# Patient Record
Sex: FEMALE | Race: OTHER | NOT HISPANIC OR LATINO
[De-identification: names, ages, dates, MRNs, and addresses within clinical notes are randomized per-mention and may not be internally consistent; named-entity substitution may affect disease eponyms.]

---

## 2023-12-12 PROBLEM — Z00.00 ENCOUNTER FOR PREVENTIVE HEALTH EXAMINATION: Status: ACTIVE | Noted: 2023-12-12

## 2023-12-15 ENCOUNTER — APPOINTMENT (OUTPATIENT)
Dept: PLASTIC SURGERY | Facility: CLINIC | Age: 38
End: 2023-12-15
Payer: COMMERCIAL

## 2023-12-15 VITALS — HEIGHT: 72 IN | BODY MASS INDEX: 28.44 KG/M2 | WEIGHT: 210 LBS

## 2023-12-15 PROCEDURE — 99204 OFFICE O/P NEW MOD 45 MIN: CPT

## 2023-12-15 RX ORDER — ESTRADIOL 10 UG/1
TABLET, FILM COATED VAGINAL
Refills: 0 | Status: ACTIVE | COMMUNITY

## 2023-12-15 NOTE — HISTORY OF PRESENT ILLNESS
[FreeTextEntry1] : JENNIFER LIN is a 37 year old male to female transgender patient with a history of gender dysphoria. She seeks consultation for facial feminization surgery. She reports that she has been socially transitioning since 2019. She has been medically transitioning since 2017. She denies significant mental health history. Her past surgical history includes laser eye surgery in 2015. She is currently in transgender care at [facility]. She denies cigarette use, drinks some alcohol, and occasionally smokes marijuana. She expresses understanding that she will need to abstain from smoking for 6 weeks before and 6 weeks after surgery. Patient denies history of previous gender affirming surgeries and gender affirming procedures such as Botox, silicone, fillers, liposuction and fat grafting. Patient denies personal and family medical history of clots, strokes, bleeding disorders and anesthesia problems. She reports that the male appearance of her face exacerbate her gender dysphoria and cause misgendering.

## 2023-12-15 NOTE — ASSESSMENT
[FreeTextEntry1] : Pt was seen and examined together by LAURA Encarnacion and Dr. Quique Villalta. Assessment and plan formulated and discussed at time of visit.

## 2023-12-20 ENCOUNTER — APPOINTMENT (OUTPATIENT)
Dept: CT IMAGING | Facility: CLINIC | Age: 38
End: 2023-12-20
Payer: COMMERCIAL

## 2023-12-20 PROCEDURE — 70486 CT MAXILLOFACIAL W/O DYE: CPT | Mod: 26

## 2024-01-04 ENCOUNTER — NON-APPOINTMENT (OUTPATIENT)
Age: 39
End: 2024-01-04

## 2024-01-19 ENCOUNTER — APPOINTMENT (OUTPATIENT)
Dept: PLASTIC SURGERY | Facility: CLINIC | Age: 39
End: 2024-01-19
Payer: COMMERCIAL

## 2024-01-19 PROCEDURE — 99213 OFFICE O/P EST LOW 20 MIN: CPT

## 2024-01-19 NOTE — HISTORY OF PRESENT ILLNESS
[FreeTextEntry1] : JENNIFER LIN is a 37 year old male to female transgender patient with a history of gender dysphoria. She seeks consultation for facial feminization surgery. She reports that she has been socially transitioning since 2019. She has been medically transitioning since 2017. She denies significant mental health history. Her past surgical history includes laser eye surgery in 2015. She is currently in transgender care at [facility]. She denies cigarette use, drinks some alcohol, and occasionally smokes marijuana. She expresses understanding that she will need to abstain from smoking for 6 weeks before and 6 weeks after surgery. Patient denies history of previous gender affirming surgeries and gender affirming procedures such as Botox, silicone, fillers, liposuction and fat grafting. Patient denies personal and family medical history of clots, strokes, bleeding disorders and anesthesia problems. She reports that the male appearance of her face exacerbate her gender dysphoria and cause misgendering.

## 2024-01-26 VITALS
HEIGHT: 72 IN | DIASTOLIC BLOOD PRESSURE: 73 MMHG | SYSTOLIC BLOOD PRESSURE: 109 MMHG | WEIGHT: 227.96 LBS | RESPIRATION RATE: 16 BRPM | TEMPERATURE: 97 F | HEART RATE: 66 BPM | OXYGEN SATURATION: 98 %

## 2024-01-28 ENCOUNTER — TRANSCRIPTION ENCOUNTER (OUTPATIENT)
Age: 39
End: 2024-01-28

## 2024-01-29 ENCOUNTER — APPOINTMENT (OUTPATIENT)
Dept: PLASTIC SURGERY | Facility: HOSPITAL | Age: 39
End: 2024-01-29
Payer: COMMERCIAL

## 2024-01-29 ENCOUNTER — TRANSCRIPTION ENCOUNTER (OUTPATIENT)
Age: 39
End: 2024-01-29

## 2024-01-29 ENCOUNTER — INPATIENT (INPATIENT)
Facility: HOSPITAL | Age: 39
LOS: 0 days | Discharge: HOME | DRG: 876 | End: 2024-01-30
Attending: SURGERY | Admitting: SURGERY
Payer: MEDICAID

## 2024-01-29 DIAGNOSIS — Z98.890 OTHER SPECIFIED POSTPROCEDURAL STATES: Chronic | ICD-10-CM

## 2024-01-29 PROCEDURE — 31899 UNLISTED PX TRACHEA BRONCHI: CPT

## 2024-01-29 PROCEDURE — 15769 GRFG AUTOL SOFT TISS DIR EXC: CPT | Mod: 59

## 2024-01-29 PROCEDURE — 21139 RDCTJ FOREHEAD CNTRG&SETBACK: CPT

## 2024-01-29 PROCEDURE — 21172 RCNST SUPR-LAT ORB RM&LW FHD: CPT

## 2024-01-29 PROCEDURE — 21256 RECONSTRUCTION OF ORBIT: CPT

## 2024-01-29 PROCEDURE — 21122 GENIOP SLDG OSTEOT 2/>: CPT

## 2024-01-29 PROCEDURE — 15824 RHYTIDECTOMY FOREHEAD: CPT

## 2024-01-29 PROCEDURE — 30420 RECONSTRUCTION OF NOSE: CPT

## 2024-01-29 DEVICE — IMPLANTABLE DEVICE: Type: IMPLANTABLE DEVICE | Status: FUNCTIONAL

## 2024-01-29 DEVICE — GUIDE CUTTING TRANSFORM XS: Type: IMPLANTABLE DEVICE | Status: FUNCTIONAL

## 2024-01-29 DEVICE — SCREW BONE RESORB 2.1X4MM SONIC PIN: Type: IMPLANTABLE DEVICE | Status: FUNCTIONAL

## 2024-01-29 DEVICE — GUIDE MARKING CRANIAL: Type: IMPLANTABLE DEVICE | Status: FUNCTIONAL

## 2024-01-29 DEVICE — SCREW MAXDRIVE 1 LVL 2X7MM: Type: IMPLANTABLE DEVICE | Status: FUNCTIONAL

## 2024-01-29 DEVICE — GUIDE CUTTING W/PLAN TRANSFORM: Type: IMPLANTABLE DEVICE | Status: FUNCTIONAL

## 2024-01-29 RX ORDER — DIPHENHYDRAMINE HCL 50 MG
25 CAPSULE ORAL EVERY 4 HOURS
Refills: 0 | Status: DISCONTINUED | OUTPATIENT
Start: 2024-01-29 | End: 2024-01-30

## 2024-01-29 RX ORDER — CHLORHEXIDINE GLUCONATE 213 G/1000ML
15 SOLUTION TOPICAL
Refills: 0 | Status: DISCONTINUED | OUTPATIENT
Start: 2024-01-29 | End: 2024-01-30

## 2024-01-29 RX ORDER — CALCIUM CARBONATE 500(1250)
1 TABLET ORAL EVERY 4 HOURS
Refills: 0 | Status: DISCONTINUED | OUTPATIENT
Start: 2024-01-29 | End: 2024-01-30

## 2024-01-29 RX ORDER — ACETAMINOPHEN 500 MG
975 TABLET ORAL EVERY 8 HOURS
Refills: 0 | Status: DISCONTINUED | OUTPATIENT
Start: 2024-01-29 | End: 2024-01-30

## 2024-01-29 RX ORDER — METOCLOPRAMIDE HCL 10 MG
10 TABLET ORAL EVERY 8 HOURS
Refills: 0 | Status: DISCONTINUED | OUTPATIENT
Start: 2024-01-29 | End: 2024-01-30

## 2024-01-29 RX ORDER — DEXAMETHASONE 0.5 MG/5ML
10 ELIXIR ORAL EVERY 12 HOURS
Refills: 0 | Status: DISCONTINUED | OUTPATIENT
Start: 2024-01-29 | End: 2024-01-30

## 2024-01-29 RX ORDER — LANOLIN ALCOHOL/MO/W.PET/CERES
3 CREAM (GRAM) TOPICAL AT BEDTIME
Refills: 0 | Status: DISCONTINUED | OUTPATIENT
Start: 2024-01-29 | End: 2024-01-30

## 2024-01-29 RX ORDER — HYDROMORPHONE HYDROCHLORIDE 2 MG/ML
0.5 INJECTION INTRAMUSCULAR; INTRAVENOUS; SUBCUTANEOUS
Refills: 0 | Status: DISCONTINUED | OUTPATIENT
Start: 2024-01-29 | End: 2024-01-30

## 2024-01-29 RX ORDER — DIAZEPAM 5 MG
5 TABLET ORAL EVERY 8 HOURS
Refills: 0 | Status: DISCONTINUED | OUTPATIENT
Start: 2024-01-29 | End: 2024-01-30

## 2024-01-29 RX ORDER — CEFAZOLIN SODIUM 1 G
2000 VIAL (EA) INJECTION EVERY 8 HOURS
Refills: 0 | Status: COMPLETED | OUTPATIENT
Start: 2024-01-29 | End: 2024-01-30

## 2024-01-29 RX ORDER — OXYCODONE HYDROCHLORIDE 5 MG/1
5 TABLET ORAL EVERY 4 HOURS
Refills: 0 | Status: DISCONTINUED | OUTPATIENT
Start: 2024-01-29 | End: 2024-01-30

## 2024-01-29 RX ORDER — GABAPENTIN 400 MG/1
300 CAPSULE ORAL EVERY 12 HOURS
Refills: 0 | Status: DISCONTINUED | OUTPATIENT
Start: 2024-01-29 | End: 2024-01-30

## 2024-01-29 RX ORDER — ACETAMINOPHEN 500 MG
1000 TABLET ORAL EVERY 6 HOURS
Refills: 0 | Status: DISCONTINUED | OUTPATIENT
Start: 2024-01-29 | End: 2024-01-30

## 2024-01-29 RX ORDER — SODIUM CHLORIDE 9 MG/ML
1000 INJECTION, SOLUTION INTRAVENOUS
Refills: 0 | Status: DISCONTINUED | OUTPATIENT
Start: 2024-01-29 | End: 2024-01-30

## 2024-01-29 RX ORDER — ONDANSETRON 8 MG/1
4 TABLET, FILM COATED ORAL EVERY 6 HOURS
Refills: 0 | Status: DISCONTINUED | OUTPATIENT
Start: 2024-01-29 | End: 2024-01-30

## 2024-01-29 RX ORDER — BENZOCAINE AND MENTHOL 5; 1 G/100ML; G/100ML
1 LIQUID ORAL EVERY 4 HOURS
Refills: 0 | Status: DISCONTINUED | OUTPATIENT
Start: 2024-01-29 | End: 2024-01-30

## 2024-01-29 RX ORDER — OXYCODONE HYDROCHLORIDE 5 MG/1
10 TABLET ORAL EVERY 4 HOURS
Refills: 0 | Status: DISCONTINUED | OUTPATIENT
Start: 2024-01-29 | End: 2024-01-30

## 2024-01-29 RX ORDER — SENNA PLUS 8.6 MG/1
2 TABLET ORAL AT BEDTIME
Refills: 0 | Status: DISCONTINUED | OUTPATIENT
Start: 2024-01-29 | End: 2024-01-30

## 2024-01-29 RX ADMIN — OXYCODONE HYDROCHLORIDE 5 MILLIGRAM(S): 5 TABLET ORAL at 23:02

## 2024-01-29 RX ADMIN — Medication 400 MILLIGRAM(S): at 23:52

## 2024-01-29 RX ADMIN — SENNA PLUS 2 TABLET(S): 8.6 TABLET ORAL at 22:02

## 2024-01-29 RX ADMIN — CHLORHEXIDINE GLUCONATE 15 MILLILITER(S): 213 SOLUTION TOPICAL at 18:00

## 2024-01-29 RX ADMIN — Medication 102 MILLIGRAM(S): at 18:28

## 2024-01-29 RX ADMIN — Medication 400 MILLIGRAM(S): at 17:59

## 2024-01-29 RX ADMIN — OXYCODONE HYDROCHLORIDE 5 MILLIGRAM(S): 5 TABLET ORAL at 22:02

## 2024-01-29 RX ADMIN — HYDROMORPHONE HYDROCHLORIDE 0.5 MILLIGRAM(S): 2 INJECTION INTRAMUSCULAR; INTRAVENOUS; SUBCUTANEOUS at 17:41

## 2024-01-29 RX ADMIN — Medication 100 MILLIGRAM(S): at 19:18

## 2024-01-29 RX ADMIN — Medication 1000 MILLIGRAM(S): at 18:48

## 2024-01-29 RX ADMIN — GABAPENTIN 300 MILLIGRAM(S): 400 CAPSULE ORAL at 18:00

## 2024-01-29 NOTE — DISCHARGE NOTE PROVIDER - NSDCFUSCHEDAPPT_GEN_ALL_CORE_FT
Clarissa Lovelace  [facility] Physician [facility]  [facility] [address]  Scheduled Appointment: 02/07/2024

## 2024-01-29 NOTE — DISCHARGE NOTE PROVIDER - NSDCFUADDINST_GEN_ALL_CORE_FT
Rx sent   MEDICATIONS:  >> A prescription for pain medication was e-prescribed to your pharmacy. Please take this as needed for severe pain.   >> Do not drive while taking the prescribed pain medication  >> Do not take the pain medication on an empty stomach, this may make you nauseous  >> Constipation is not uncommon when taking prescription pain medication. You may take an over the counter stool softener like Dulcolax or Sennakot to help with this.   >> You may take over the counter pain medication such as tylenol (acetaminophen) or Advil (ibuprofen) for pain.   >> Please use peridex rinse - swish and spit twice daily for 7 days.     ACTIVITY:  >> No bending or heavy lifting. Keep your head above the level of your heart. At your first post-op visit we will assess how you are doing and will adjust the restrictions as needed.   >> Sleep with head elevated on at least 2 pillows.     DIET:  >> Intraoral incisions: Clear liquid diet for first 48 hours then may advance to full liquid diet for 7 days. Then advance to soft diet. Avoid overly hot, cold, spicy, or acidic foods.   >> Do not drink alcohol in the immediate postoperative period and while taking prescription pain medication.     WOUND CARE:  >> Gently brush teeth and keep mouth clean after eating. At your first postop visit we will assess the wound and instruct you of any care needed.   >> Do not get your face wet, but you can wash your hair and the rest of your body. If you have a nasal splint - DO NOT GET THIS WET.   >> You may gently apply ice packs, or frozen peas to the nose and eye regions. Apply this for the first 48-72 hours. 20 minutes on and 20 minutes off. If you have a nasal splint, DO NOT apply ice to your nose.   >> Keep nasal splint on until your next postop visit, do not get this wet. Change gauze under nose as needed.  Nose will bleed over the next day or two.  This is normal.  It will be a mix of blood and mucus.  If you are noticing bleeding that does not stop or is saturating more than 2-3 gauzes per hour please call our office.   >> If you were given a facial/chin garment (Jaw Bra), please wear this 24/7 until your first postop visit.      Contact us with any questions or concerns. Please follow up with Dr. Villalta within x1 week after discharge from the hospital. You may call [phone] to schedule an appointment.

## 2024-01-29 NOTE — DISCHARGE NOTE PROVIDER - NSDCCPCAREPLAN_GEN_ALL_CORE_FT
PRINCIPAL DISCHARGE DIAGNOSIS  Diagnosis: Gender dysphoria in adult  Assessment and Plan of Treatment:

## 2024-01-29 NOTE — BRIEF OPERATIVE NOTE - NSICDXBRIEFPROCEDURE_GEN_ALL_CORE_FT
PROCEDURES:  Contouring, forehead, anterior frontal sinus wall, for reduction and setback 29-Jan-2024 13:08:18  Atamian, Stefanie  Reconstruction of both supraorbital rims 29-Jan-2024 13:08:30  Atamian, Stefanie  Liposuction, with autologous fat graft transfer to face 29-Jan-2024 13:08:45  Atamian, Stefanie  Augmentation, lip 29-Jan-2024 13:08:56  Atamian, Stefanie  Genioplasty 29-Jan-2024 13:09:05  Atamian, Stefanie  Complete rhinoplasty 29-Jan-2024 13:09:14  Atamian, Stefanie  Cervical tracheoplasty 29-Jan-2024 13:09:22  Atamian, Stefanie

## 2024-01-29 NOTE — DISCHARGE NOTE PROVIDER - CARE PROVIDER_API CALL
Quique Villalta  Plastic Surgery  [address], [address]  [place] [address]  Phone: [phone]  Fax: [phone]  Follow Up Time: 1 week

## 2024-01-29 NOTE — DISCHARGE NOTE PROVIDER - NSDCMRMEDTOKEN_GEN_ALL_CORE_FT
Apretude 600 mg/3 mL intramuscular suspension, extended release: 600 milligram(s) intramuscularly every 2 months  Cialis 20 mg oral tablet: 1 tab(s) orally prn  estradiol valerate 20 mg/mL intramuscular solution: 20 milligram(s) intramuscularly once a week  gabapentin 300 mg oral capsule: 1 cap(s) orally prn  Pepcid 40 mg oral tablet: 1 tab(s) orally prn

## 2024-01-29 NOTE — DISCHARGE NOTE PROVIDER - HOSPITAL COURSE
Patient is a 38 year old female with history of gender dysphoria disorder who underwent facial feminization surgery with Dr. Villalta on 1/29/24. Patient underwent frontal sinus and bilateral orbital rim contouring, brow lift, genioplasty, mandible angle reduction, rhinoplasty and platysmaplasty. She recovered well in PACU and was transferred to surgical floor for observation for two nights. Patient was tolerating clear liquid PO and advanced to full liquid PO on POD 1. Dressings were removed and changed and SVEN drain was taken out prior to discharge. The patients vitals were stable and she was agreeable with plan for discharge and out patient follow up. Prescription medications sent to pharmacy.

## 2024-01-30 ENCOUNTER — TRANSCRIPTION ENCOUNTER (OUTPATIENT)
Age: 39
End: 2024-01-30

## 2024-01-30 VITALS
DIASTOLIC BLOOD PRESSURE: 61 MMHG | OXYGEN SATURATION: 97 % | SYSTOLIC BLOOD PRESSURE: 104 MMHG | RESPIRATION RATE: 16 BRPM | TEMPERATURE: 98 F | HEART RATE: 68 BPM

## 2024-01-30 PROCEDURE — C9399: CPT

## 2024-01-30 PROCEDURE — C1889: CPT

## 2024-01-30 PROCEDURE — C1713: CPT

## 2024-01-30 RX ORDER — OXYCODONE AND ACETAMINOPHEN 5; 325 MG/1; MG/1
5-325 TABLET ORAL
Qty: 12 | Refills: 0 | Status: ACTIVE | COMMUNITY
Start: 2024-01-30 | End: 1900-01-01

## 2024-01-30 RX ORDER — AMOXICILLIN AND CLAVULANATE POTASSIUM 500; 125 MG/1; MG/1
500-125 TABLET, FILM COATED ORAL TWICE DAILY
Qty: 10 | Refills: 0 | Status: ACTIVE | COMMUNITY
Start: 2024-01-30 | End: 1900-01-01

## 2024-01-30 RX ORDER — METHYLPREDNISOLONE 4 MG/1
4 TABLET ORAL
Qty: 1 | Refills: 0 | Status: ACTIVE | COMMUNITY
Start: 2024-01-30 | End: 1900-01-01

## 2024-01-30 RX ORDER — CHLORHEXIDINE GLUCONATE, 0.12% ORAL RINSE 1.2 MG/ML
0.12 SOLUTION DENTAL
Qty: 150 | Refills: 4 | Status: ACTIVE | COMMUNITY
Start: 2024-01-30 | End: 1900-01-01

## 2024-01-30 RX ADMIN — Medication 400 MILLIGRAM(S): at 06:06

## 2024-01-30 RX ADMIN — CHLORHEXIDINE GLUCONATE 15 MILLILITER(S): 213 SOLUTION TOPICAL at 06:06

## 2024-01-30 RX ADMIN — Medication 102 MILLIGRAM(S): at 06:32

## 2024-01-30 RX ADMIN — OXYCODONE HYDROCHLORIDE 5 MILLIGRAM(S): 5 TABLET ORAL at 03:23

## 2024-01-30 RX ADMIN — Medication 100 MILLIGRAM(S): at 04:14

## 2024-01-30 RX ADMIN — OXYCODONE HYDROCHLORIDE 5 MILLIGRAM(S): 5 TABLET ORAL at 04:23

## 2024-01-30 RX ADMIN — GABAPENTIN 300 MILLIGRAM(S): 400 CAPSULE ORAL at 06:06

## 2024-01-30 NOTE — DISCHARGE NOTE NURSING/CASE MANAGEMENT/SOCIAL WORK - PATIENT PORTAL LINK FT
You can access the FollowMyHealth Patient Portal offered by [facility] by registering at the following website: [email or web address]. By joining [facility]’s FollowMyHealth portal, you will also be able to view your health information using other applications (apps) compatible with our system.

## 2024-01-30 NOTE — PROGRESS NOTE ADULT - ASSESSMENT
39 y/o transfemale s/p facial feminization surgery  - new jaw bra,  - remove silvestre  - liquid diet  - prn analgesia  - dc home today

## 2024-01-30 NOTE — PROGRESS NOTE ADULT - SUBJECTIVE AND OBJECTIVE BOX
SUBJECTIVE:  Doing well.   No overnight events.     OBJECTIVE:     ** VITAL SIGNS / I&O's **    Vital Signs Last 24 Hrs  T(C): 37.1 (30 Jan 2024 05:01), Max: 37.1 (30 Jan 2024 05:01)  T(F): 98.7 (30 Jan 2024 05:01), Max: 98.7 (30 Jan 2024 05:01)  HR: 69 (30 Jan 2024 05:01) (66 - 92)  BP: 110/71 (30 Jan 2024 05:01) (99/59 - 110/71)  BP(mean): 84 (30 Jan 2024 05:01) (71 - 84)  RR: 17 (30 Jan 2024 05:01) (2 - 21)  SpO2: 94% (30 Jan 2024 05:01) (94% - 100%)    Parameters below as of 30 Jan 2024 05:01  Patient On (Oxygen Delivery Method): room air          29 Jan 2024 07:01  -  30 Jan 2024 07:00  --------------------------------------------------------  IN:    IV PiggyBack: 50 mL    IV PiggyBack: 200 mL    IV PiggyBack: 50 mL    Lactated Ringers: 600 mL  Total IN: 900 mL    OUT:    Bulb (mL): 50 mL    Voided (mL): 800 mL  Total OUT: 850 mL    Total NET: 50 mL          ** PHYSICAL EXAM **    -- CONSTITUTIONAL: Alert, Awake. NAD.   -- RESPIRATORY: unlabored breathing, no respiratory distress  -- HEENT: dressings taken down, incision c/d/i, silvestre serosanguinous, nasal splint in place      ** LABS **              CAPILLARY BLOOD GLUCOSE

## 2024-02-02 DIAGNOSIS — F64.0 TRANSSEXUALISM: ICD-10-CM

## 2024-02-02 DIAGNOSIS — F64.9 GENDER IDENTITY DISORDER, UNSPECIFIED: ICD-10-CM

## 2024-02-07 ENCOUNTER — APPOINTMENT (OUTPATIENT)
Dept: PLASTIC SURGERY | Facility: CLINIC | Age: 39
End: 2024-02-07
Payer: COMMERCIAL

## 2024-02-07 PROCEDURE — 99024 POSTOP FOLLOW-UP VISIT: CPT

## 2024-02-07 NOTE — HISTORY OF PRESENT ILLNESS
[FreeTextEntry1] : JENNIFER LIN is a 38 year old patient here s/p FFS surgery on 1/29/24  1.  Forehead reduction, hairline advancement. 15824 2.  Supraorbital rim reduction. 21254 3.  Lateral orbital rim osteotomy and temporal reduction. 21172 4.  Frontal sinus setback.21139 5.  Fascial graft to the lips. 15764 6.  Osseous genioplasty 21122 7.  Open septorhinoplasty. 45291 8.  Tracheal shave.[id number]

## 2024-02-12 PROBLEM — Z78.9 OTHER SPECIFIED HEALTH STATUS: Chronic | Status: ACTIVE | Noted: 2024-01-26

## 2024-02-21 ENCOUNTER — APPOINTMENT (OUTPATIENT)
Dept: PLASTIC SURGERY | Facility: CLINIC | Age: 39
End: 2024-02-21
Payer: COMMERCIAL

## 2024-02-21 PROCEDURE — 99024 POSTOP FOLLOW-UP VISIT: CPT

## 2024-02-21 NOTE — HISTORY OF PRESENT ILLNESS
[FreeTextEntry1] : JENNIFER LIN is a 38 year old patient here s/p FFS surgery on 1/29/24  1.  Forehead reduction, hairline advancement. 15824 2.  Supraorbital rim reduction. 21254 3.  Lateral orbital rim osteotomy and temporal reduction. 21172 4.  Frontal sinus setback.21139 5.  Fascial graft to the lips. 15764 6.  Osseous genioplasty 21122 7.  Open septorhinoplasty. 38864 8.  Tracheal shave.20003

## 2024-04-03 ENCOUNTER — APPOINTMENT (OUTPATIENT)
Dept: PLASTIC SURGERY | Facility: CLINIC | Age: 39
End: 2024-04-03
Payer: COMMERCIAL

## 2024-04-03 PROCEDURE — 99024 POSTOP FOLLOW-UP VISIT: CPT

## 2024-04-03 NOTE — HISTORY OF PRESENT ILLNESS
[FreeTextEntry1] : JENNIFER LIN is a 38 year old patient here s/p FFS surgery on 1/29/24  1.  Forehead reduction, hairline advancement. 15824 2.  Supraorbital rim reduction. 21254 3.  Lateral orbital rim osteotomy and temporal reduction. 21172 4.  Frontal sinus setback.21139 5.  Fascial graft to the lips. 15764 6.  Osseous genioplasty 21122 7.  Open septorhinoplasty. 19755 8.  Tracheal shave.70140  Doing well no complaints. She is also here to discuss upcoming breast augmenation surgery

## 2024-04-23 ENCOUNTER — TRANSCRIPTION ENCOUNTER (OUTPATIENT)
Age: 39
End: 2024-04-23

## 2024-04-23 RX ORDER — CHLORHEXIDINE GLUCONATE 213 G/1000ML
1 SOLUTION TOPICAL DAILY
Refills: 0 | Status: DISCONTINUED | OUTPATIENT
Start: 2024-04-24 | End: 2024-04-24

## 2024-04-23 NOTE — ASU PATIENT PROFILE, ADULT - ABLE TO REACH PT
Left voicemail message with time and instruction; patient instructed to arrive at 09:30am; No solid/dairy/candy/gum after midnight tonight, water allowed before 08:30am, reminded to come with photo ID/insurance/credit card, arrange for escort, escort must have photo ID, no smoking/alcohol drinking/illicit drug use today; dress in comfortable clothes, no jewelries/contact lens; address and phone number was given./no [Normal Growth] : growth [None] : No known medical problems [Normal Development] : development [No Elimination Concerns] : elimination [No Feeding Concerns] : feeding [No Skin Concerns] : skin [Normal Sleep Pattern] : sleep [Language Promotion and Communication] : language promotion and communication [Family Routines] : family routines [Social Development] : social development [ Considerations] :  considerations [No Medications] : ~He/She~ is not on any medications [Safety] : safety [Parent/Guardian] : parent/guardian [] : The components of the vaccine(s) to be administered today are listed in the plan of care. The disease(s) for which the vaccine(s) are intended to prevent and the risks have been discussed with the caretaker.  The risks are also included in the appropriate vaccination information statements which have been provided to the patient's caregiver.  The caregiver has given consent to vaccinate. [FreeTextEntry1] : Continue cow's milk. Continue table foods, 3 meals with 2-3 snacks per day. Incorporate flourinated water daily in a sippy cup. Brush teeth twice a day with soft toothbrush. Recommend visit to dentist. When in car, keep child in rear-facing car seats until age 2, or until  the maximum height and weight for seat is reached. Put toddler to sleep in own bed. Help toddler to maintain consistent daily routines and sleep schedule. Toilet training discussed. Ensure home is safe. Use consistent, positive discipline. Read aloud to toddler. Limit screen time to no more than 2 hours per day.

## 2024-04-24 ENCOUNTER — APPOINTMENT (OUTPATIENT)
Dept: PLASTIC SURGERY | Facility: AMBULATORY SURGERY CENTER | Age: 39
End: 2024-04-24
Payer: COMMERCIAL

## 2024-04-24 ENCOUNTER — OUTPATIENT (OUTPATIENT)
Dept: OUTPATIENT SERVICES | Facility: HOSPITAL | Age: 39
LOS: 1 days | Discharge: HOME | End: 2024-04-24

## 2024-04-24 ENCOUNTER — TRANSCRIPTION ENCOUNTER (OUTPATIENT)
Age: 39
End: 2024-04-24

## 2024-04-24 VITALS
HEART RATE: 62 BPM | DIASTOLIC BLOOD PRESSURE: 67 MMHG | SYSTOLIC BLOOD PRESSURE: 112 MMHG | TEMPERATURE: 97 F | RESPIRATION RATE: 16 BRPM | OXYGEN SATURATION: 98 %

## 2024-04-24 VITALS
RESPIRATION RATE: 14 BRPM | HEART RATE: 56 BPM | HEIGHT: 72 IN | DIASTOLIC BLOOD PRESSURE: 63 MMHG | TEMPERATURE: 98 F | OXYGEN SATURATION: 98 % | WEIGHT: 209.88 LBS | SYSTOLIC BLOOD PRESSURE: 114 MMHG

## 2024-04-24 DIAGNOSIS — Z98.890 OTHER SPECIFIED POSTPROCEDURAL STATES: Chronic | ICD-10-CM

## 2024-04-24 PROCEDURE — 19325 BREAST AUGMENTATION W/IMPLT: CPT | Mod: 50

## 2024-04-24 DEVICE — IMPLANTABLE DEVICE: Type: IMPLANTABLE DEVICE | Status: FUNCTIONAL

## 2024-04-24 RX ORDER — OXYCODONE HYDROCHLORIDE 5 MG/1
5 TABLET ORAL ONCE
Refills: 0 | Status: DISCONTINUED | OUTPATIENT
Start: 2024-04-24 | End: 2024-04-24

## 2024-04-24 RX ORDER — APREPITANT 80 MG/1
40 CAPSULE ORAL ONCE
Refills: 0 | Status: COMPLETED | OUTPATIENT
Start: 2024-04-24 | End: 2024-04-24

## 2024-04-24 RX ORDER — ACETAMINOPHEN 500 MG
1000 TABLET ORAL ONCE
Refills: 0 | Status: COMPLETED | OUTPATIENT
Start: 2024-04-24 | End: 2024-04-24

## 2024-04-24 RX ORDER — OXYCODONE HYDROCHLORIDE 5 MG/1
1 TABLET ORAL
Qty: 12 | Refills: 0
Start: 2024-04-24 | End: 2024-04-26

## 2024-04-24 RX ORDER — FENTANYL CITRATE 50 UG/ML
50 INJECTION INTRAVENOUS
Refills: 0 | Status: DISCONTINUED | OUTPATIENT
Start: 2024-04-24 | End: 2024-04-24

## 2024-04-24 RX ORDER — SODIUM CHLORIDE 9 MG/ML
500 INJECTION, SOLUTION INTRAVENOUS
Refills: 0 | Status: DISCONTINUED | OUTPATIENT
Start: 2024-04-24 | End: 2024-04-24

## 2024-04-24 RX ORDER — CABOTEGRAVIR SODIUM 30 MG/1
600 TABLET, FILM COATED ORAL
Refills: 0 | DISCHARGE

## 2024-04-24 RX ORDER — FAMOTIDINE 10 MG/ML
1 INJECTION INTRAVENOUS
Refills: 0 | DISCHARGE

## 2024-04-24 RX ORDER — TADALAFIL 10 MG/1
1 TABLET, FILM COATED ORAL
Refills: 0 | DISCHARGE

## 2024-04-24 RX ORDER — ONDANSETRON 8 MG/1
4 TABLET, FILM COATED ORAL ONCE
Refills: 0 | Status: DISCONTINUED | OUTPATIENT
Start: 2024-04-24 | End: 2024-04-24

## 2024-04-24 RX ORDER — GABAPENTIN 400 MG/1
1 CAPSULE ORAL
Refills: 0 | DISCHARGE

## 2024-04-24 RX ADMIN — SODIUM CHLORIDE 100 MILLILITER(S): 9 INJECTION, SOLUTION INTRAVENOUS at 14:00

## 2024-04-24 RX ADMIN — FENTANYL CITRATE 50 MICROGRAM(S): 50 INJECTION INTRAVENOUS at 14:22

## 2024-04-24 RX ADMIN — FENTANYL CITRATE 50 MICROGRAM(S): 50 INJECTION INTRAVENOUS at 14:07

## 2024-04-24 RX ADMIN — Medication 1000 MILLIGRAM(S): at 10:20

## 2024-04-24 RX ADMIN — APREPITANT 40 MILLIGRAM(S): 80 CAPSULE ORAL at 10:20

## 2024-04-24 NOTE — ASU DISCHARGE PLAN (ADULT/PEDIATRIC) - NS MD DC FALL RISK RISK
For information on Fall & Injury Prevention, visit: [email or web address] OR  [email or web address] OR  https://www.cdc.gov/steadi/patient.html

## 2024-04-25 RX ORDER — OXYCODONE AND ACETAMINOPHEN 5; 325 MG/1; MG/1
5-325 TABLET ORAL
Qty: 15 | Refills: 0 | Status: ACTIVE | COMMUNITY
Start: 2024-04-25 | End: 1900-01-01

## 2024-04-26 RX ORDER — CEPHALEXIN 250 MG/1
250 TABLET ORAL
Qty: 10 | Refills: 0 | Status: ACTIVE | COMMUNITY
Start: 2024-04-25 | End: 1900-01-01

## 2024-05-01 ENCOUNTER — APPOINTMENT (OUTPATIENT)
Dept: PLASTIC SURGERY | Facility: CLINIC | Age: 39
End: 2024-05-01
Payer: COMMERCIAL

## 2024-05-01 PROCEDURE — 99024 POSTOP FOLLOW-UP VISIT: CPT

## 2024-05-01 NOTE — HISTORY OF PRESENT ILLNESS
[FreeTextEntry1] : JENNIFER LIN is a 38 year old patient here s/p FFS surgery on 1/29/24  1.  Forehead reduction, hairline advancement. 15824 2.  Supraorbital rim reduction. 21254 3.  Lateral orbital rim osteotomy and temporal reduction. 21172 4.  Frontal sinus setback.21139 5.  Fascial graft to the lips. 15764 6.  Osseous genioplasty 21122 7.  Open septorhinoplasty. 76384 8.  Tracheal shave.49425  She is now s/p Bilateral breast augmentation with silicone implants, 525 cc moderate profile plus Sientra.

## 2024-05-22 ENCOUNTER — APPOINTMENT (OUTPATIENT)
Dept: PLASTIC SURGERY | Facility: CLINIC | Age: 39
End: 2024-05-22

## 2024-05-22 ENCOUNTER — APPOINTMENT (OUTPATIENT)
Dept: PLASTIC SURGERY | Facility: CLINIC | Age: 39
End: 2024-05-22
Payer: COMMERCIAL

## 2024-05-22 PROCEDURE — 99024 POSTOP FOLLOW-UP VISIT: CPT

## 2024-05-22 NOTE — HISTORY OF PRESENT ILLNESS
[FreeTextEntry1] : JENNIFER LIN is a 38 year old patient here s/p FFS surgery on 1/29/24  1.  Forehead reduction, hairline advancement. 15824 2.  Supraorbital rim reduction. 21254 3.  Lateral orbital rim osteotomy and temporal reduction. 21172 4.  Frontal sinus setback.21139 5.  Fascial graft to the lips. 15764 6.  Osseous genioplasty 21122 7.  Open septorhinoplasty. 10574 8.  Tracheal shave.36149  She is now s/p Bilateral breast augmentation with silicone implants, 525 cc moderate profile plus Sientra.

## 2024-06-11 RX ORDER — CEPHALEXIN 500 MG/1
500 CAPSULE ORAL
Qty: 6 | Refills: 0 | Status: ACTIVE | COMMUNITY
Start: 2024-06-11 | End: 1900-01-01

## 2024-06-11 RX ORDER — OXYCODONE AND ACETAMINOPHEN 5; 325 MG/1; MG/1
5-325 TABLET ORAL
Qty: 8 | Refills: 0 | Status: ACTIVE | COMMUNITY
Start: 2024-06-11 | End: 1900-01-01

## 2024-06-11 RX ORDER — CHLORHEXIDINE GLUCONATE 213 G/1000ML
4 SOLUTION TOPICAL
Qty: 1 | Refills: 0 | Status: ACTIVE | COMMUNITY
Start: 2024-06-11 | End: 1900-01-01

## 2024-06-11 RX ORDER — DIAZEPAM 5 MG/1
5 TABLET ORAL
Qty: 2 | Refills: 0 | Status: ACTIVE | COMMUNITY
Start: 2024-06-11 | End: 1900-01-01

## 2024-06-12 ENCOUNTER — APPOINTMENT (OUTPATIENT)
Dept: PLASTIC SURGERY | Facility: CLINIC | Age: 39
End: 2024-06-12

## 2024-06-19 ENCOUNTER — APPOINTMENT (OUTPATIENT)
Dept: PLASTIC SURGERY | Facility: CLINIC | Age: 39
End: 2024-06-19

## 2024-06-19 DIAGNOSIS — F64.9 GENDER IDENTITY DISORDER, UNSPECIFIED: ICD-10-CM

## 2024-06-19 PROCEDURE — 15776 HAIR TRNSPL >15 PUNCH GRAFTS: CPT

## 2024-06-30 PROBLEM — F64.9 GENDER DYSPHORIA: Status: ACTIVE | Noted: 2023-12-12

## 2024-07-17 ENCOUNTER — APPOINTMENT (OUTPATIENT)
Dept: PLASTIC SURGERY | Facility: CLINIC | Age: 39
End: 2024-07-17
Payer: COMMERCIAL

## 2024-07-17 DIAGNOSIS — F64.9 GENDER IDENTITY DISORDER, UNSPECIFIED: ICD-10-CM

## 2024-07-17 PROCEDURE — 99024 POSTOP FOLLOW-UP VISIT: CPT

## (undated) DEVICE — SUT VICRYL 6-0 18" P-3 UNDYED

## (undated) DEVICE — SYR LUER LOK 50CC

## (undated) DEVICE — GOWN ROYAL SILK XL

## (undated) DEVICE — ONETRAC LIGHTED RETRACTOR 135 X 30MM DISP

## (undated) DEVICE — BLOCK SILICON

## (undated) DEVICE — ELCTR STRYKER EXTENSION SUCTION TIP 165MM

## (undated) DEVICE — ELCTR PENCIL SMOKE EVACUATOR COATED PUSH BUTTON 70MM

## (undated) DEVICE — GLV 6.5 PROTEXIS (WHITE)

## (undated) DEVICE — BIPOLAR FORCEP KIRWAN JEWELERS STR 4" X 0.4MM W 12FT CORD (GREEN)

## (undated) DEVICE — DRAPE TOWEL BLUE 17" X 24"

## (undated) DEVICE — DRAPE MAGNETIC INSTRUMENT MEDIUM

## (undated) DEVICE — DRSG SURGICAL BRA MED 36-38

## (undated) DEVICE — ELCTR COLORADO 3CM

## (undated) DEVICE — PACK BREAST RECONSTRUCTION

## (undated) DEVICE — VAGINAL PACKING 2"

## (undated) DEVICE — ELCTR STRYKER NEPTUNE BLADE COATED, INSULATED 70MM

## (undated) DEVICE — SUT SILK 2-0 30" PSL

## (undated) DEVICE — RASP STRYKER LARGE TEAR CROSSCUT 14X7MM DISP

## (undated) DEVICE — SUT VICRYL 2-0 27" CT-2 UNDYED

## (undated) DEVICE — DRSG XEROFORM 1 X 8"

## (undated) DEVICE — ELCTR BOVIE TIP BLADE INSULATED 2.75" EDGE

## (undated) DEVICE — BLADE INTRAORAL 11.5X0.38X12.0MM

## (undated) DEVICE — BLADE SURGICAL #10 CARBON

## (undated) DEVICE — GLV 7.5 PROTEXIS (WHITE)

## (undated) DEVICE — DRSG TELFA 3 X 8

## (undated) DEVICE — DRAPE MEDIUM SHEET 44" X 70"

## (undated) DEVICE — AESCULAP SCALPFIX 10 CLIPS

## (undated) DEVICE — DRSG KERLIX ROLL 4.5"

## (undated) DEVICE — DRSG CURITY GAUZE SPONGE 4 X 4" 12-PLY

## (undated) DEVICE — DRSG DERMABOND PRINEO 60CM

## (undated) DEVICE — MODEL IPS TRANSFORM

## (undated) DEVICE — NDL SPINAL 22G X 3.5" (BLACK)

## (undated) DEVICE — DRSG STERISTRIPS 0.5 X 4"

## (undated) DEVICE — BATTERY PACK KLS MARTIN SINGLE USE

## (undated) DEVICE — DRSG TEGADERM 2.5X3"

## (undated) DEVICE — BUR STRYKER CARBIDE CROSS CUT FISSURE 1.2MM

## (undated) DEVICE — DRSG SURGICAL BRA XL 40-42

## (undated) DEVICE — SUT VICRYL 2-0 27" CT-1 UNDYED

## (undated) DEVICE — DRAPE INSTRUMENT POUCH 6.75" X 11"

## (undated) DEVICE — SUT MONOCRYL 3-0 27" PS-1 UNDYED

## (undated) DEVICE — SUT PLAIN GUT FAST ABSORBING 5-0 PC-1

## (undated) DEVICE — RUBBERBAND STRL LTX FR 200/CA 3X1/8IN

## (undated) DEVICE — SYR ASEPTO

## (undated) DEVICE — KLS MARTIN MODEL CUSTOM HALF IPS

## (undated) DEVICE — DRSG GAUZE MOISTURIZER 0.5 OZ 4X8

## (undated) DEVICE — SAW BLADE OSTEOMED VRO 12MM

## (undated) DEVICE — DRSG AQUAPLAST BLANK BLUSH 3 X 3"

## (undated) DEVICE — SUT MONOCRYL 5-0 18" P-3 UNDYED

## (undated) DEVICE — MARKING PEN W RULER

## (undated) DEVICE — DRSG MASTISOL

## (undated) DEVICE — SUT MONOCRYL 4-0 18" P-3 UNDYED

## (undated) DEVICE — STAPLER SKIN VISI-STAT 35 WIDE

## (undated) DEVICE — DRSG TUBE SPANDAGE 8 10YD

## (undated) DEVICE — TUBING SUCTION NONCONDUCTIVE 6MM X 12FT

## (undated) DEVICE — SUT QUILL MONODERM 2-0 30CM 24MM

## (undated) DEVICE — WARMING BLANKET LOWER ADULT

## (undated) DEVICE — WARMING BLANKET UPPER ADULT

## (undated) DEVICE — SUT VICRYL 3-0 27" PS-2 UNDYED

## (undated) DEVICE — VENODYNE/SCD SLEEVE CALF MEDIUM

## (undated) DEVICE — ELCTR STRYKER BLADE COATED INSULATED 165MM

## (undated) DEVICE — NDL HYPO SAFE 25G X 1.5" (ORANGE)

## (undated) DEVICE — SUT MONOCRYL 3-0 18" PS-1

## (undated) DEVICE — SUT VICRYL 3-0 27" SH

## (undated) DEVICE — SUT MONOCRYL 4-0 27" PS-2 UNDYED

## (undated) DEVICE — SUT PLAIN GUT 4-0 18" PS-2

## (undated) DEVICE — DRAPE LIGHT HANDLE COVER (GREEN)

## (undated) DEVICE — LAP PAD 4 X 18"

## (undated) DEVICE — CATH NG SALEM SUMP 16FR

## (undated) DEVICE — SYR LUER LOK 1CC

## (undated) DEVICE — SUT ETHILON 4-0 18" PS-2 UNDYED

## (undated) DEVICE — MODEL CRANIUM CRYSTAL IPS

## (undated) DEVICE — WOUND IRR IRRISEPT W 0.5 CHG

## (undated) DEVICE — DRSG SURGICAL BRA LG 38-40

## (undated) DEVICE — COMPRESSION CHIN-NECK SMALL

## (undated) DEVICE — SYR LUER LOK 20CC

## (undated) DEVICE — SUCTION YANKAUER BULBOUS TIP NO VENT

## (undated) DEVICE — CATH ANGIOCATH 12G

## (undated) DEVICE — GLV 8 PROTEXIS (WHITE)

## (undated) DEVICE — SUCTION YANKAUER VITAL VUE

## (undated) DEVICE — SUT VICRYL 2-0 27" SH UNDYED

## (undated) DEVICE — DRSG XEROFORM 5 X 9"

## (undated) DEVICE — DRAIN RESERVOIR FOR JACKSON PRATT 100CC CARDINAL

## (undated) DEVICE — BUR STRYKER EGG 4MM

## (undated) DEVICE — SAW BLADE STRYKER RECIPROCATING 27MMX0.38MM

## (undated) DEVICE — DRSG COMBINE 5X9"

## (undated) DEVICE — SUT CHROMIC 5-0 18" PS-3

## (undated) DEVICE — SUT MONOCRYL PLUS 4-0 18" PS-2 UNDYED

## (undated) DEVICE — SUT CHROMIC 3-0 27" PS-2

## (undated) DEVICE — BLADE SURGICAL #15 CARBON

## (undated) DEVICE — SYR LUER LOK 3CC

## (undated) DEVICE — TAPE SILK 3"

## (undated) DEVICE — DRAPE IRRIGATION POUCH 19X23"

## (undated) DEVICE — SYR LUER LOK 5CC

## (undated) DEVICE — KELLER FUNNEL

## (undated) DEVICE — SYR LUER LOK 30CC

## (undated) DEVICE — DRAIN JACKSON PRATT 7MM FLAT FULL NO TROCAR

## (undated) DEVICE — Device

## (undated) DEVICE — DRILL BIT KLS MARTIN TWIST STOP 1.6X40MM

## (undated) DEVICE — APPLICATOR Q TIP 6" WOOD STEM

## (undated) DEVICE — GLV 7 PROTEXIS (WHITE)

## (undated) DEVICE — FOLEY TRAY 16FR 5CC LF UMETER CLOSED

## (undated) DEVICE — NDL 18G BLUNT FILL PINK

## (undated) DEVICE — PACK UPPER BODY

## (undated) DEVICE — PREP CHLORAPREP HI-LITE ORANGE 26ML

## (undated) DEVICE — BLADE SURGICAL #11 CARBON

## (undated) DEVICE — DRAIN BLAKE 10FR ROUND

## (undated) DEVICE — SYR LUER LOK 10CC